# Patient Record
Sex: MALE | Race: WHITE | NOT HISPANIC OR LATINO | URBAN - METROPOLITAN AREA
[De-identification: names, ages, dates, MRNs, and addresses within clinical notes are randomized per-mention and may not be internally consistent; named-entity substitution may affect disease eponyms.]

---

## 2018-09-18 ENCOUNTER — OFFICE VISIT - HEALTHEAST (OUTPATIENT)
Dept: OTOLARYNGOLOGY | Facility: CLINIC | Age: 23
End: 2018-09-18

## 2018-09-18 ENCOUNTER — OFFICE VISIT - HEALTHEAST (OUTPATIENT)
Dept: AUDIOLOGY | Facility: CLINIC | Age: 23
End: 2018-09-18

## 2018-09-18 DIAGNOSIS — H72.91 PERFORATION OF RIGHT TYMPANIC MEMBRANE: ICD-10-CM

## 2018-09-18 DIAGNOSIS — H93.13 TINNITUS OF BOTH EARS: ICD-10-CM

## 2018-09-18 DIAGNOSIS — H72.91 PERFORATION OF TYMPANIC MEMBRANE, RIGHT: ICD-10-CM

## 2021-06-20 NOTE — PROGRESS NOTES
Hearing evaluation in conjunction with ENT exam (Dr. Mcfadden)    History:  Patient was referred from ED visit 8-19-18, with reported trauma in right ear due to likely cotton swab use and a possible right tympanic membrane perforation. Patient reported crackling in his ears, assumed it was cerumen, and tried to remove it. He still reported crackling, bilaterally, today, with bilateral non-pulsatile tinnitus, worse in the right ear than in the left. He endorsed aural fullness, wetness in his right ear, and difficulty hearing. 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 He indicated he does not wear hearing protection devices while at work.    Results:  Otoscopy was unremarkable in either ear. Hearing sensitivity was assessed with good reliability using insert phones.      Normal/borderline normal hearing sensitivity for 250-8000 Hz, bilaterally.    Speech reception thresholds showed agreement with pure tone findings in each ear. Word recognition ability was excellent, bilaterally, with presentation levels at typical conversational volume in both ears.    Tympanometry for the left ear yielded a hypermobile tracing, positive middle ear pressure, and normal canal volume. Tympanometry for the right ear yielded a flat tracing with a large canal volume, consistent with a tympanic membrane perforation in the right ear.    Recommendations:  Follow-up with ENT; retest hearing per medical management or patient concern.  Wear hearing protection consistently in noise to preserve residual hearing sensitivity.     Nash Edmonds, [facility]  [place] Licensed Audiologist [id number]

## 2021-06-20 NOTE — PROGRESS NOTES
Jarad Delgado is a 23 y.o. male seen in consultation for tinnitus.  Patient has noticed bilateral tinnitus (right worse than left), nonpulsatile.  Denies otologic history of infections or surgeries. Denies vertigo.  Was recently evaluated in the ED for foreign body in the ear and was noted to have a right perforation in the ear.  Had some infections as a child but does not recall tubes.    ALLERGY:  No Known Allergies    MEDICATIONS:     No current outpatient prescriptions on file prior to visit.     No current facility-administered medications on file prior to visit.        Past Medical/Surgical History, Family History and Social History reviewed in detail and documented separately in the medical record.    Complete Review of Systems:  A 10-point review was performed.  Pertinent positives are noted in the HPI and on a separate scanned document in the chart.    EXAM:  There were no vitals filed for this visit.    Nurse documentation reviewed  and documented separately.    General Appearance: Pleasant, alert, appropriate appearance for age. No acute distress    Head Exam: Normal. Normocephalic, atraumatic.    Eye Exam: Normal external eye, conjunctiva, lids, cornea. Extra-ocular movements are intact.    Left external ear: normal  Left otoscopic exam: Normal EAC. Normal TM     Right external ear: normal  Right otoscopic exam: Normal EAC. TM roughly normal but with radial ?Perforation? At the 6 o'clock position.  This is no more than a millimeter wide and a few millimeters long.    Nose Exam: Normal external nose. Septum midline. Nasal mucosa normal.  Inferior turbinates normal.    OroPharynx Exam: Dental hygiene adequate. Normal tongue. Normal buccal mucosa. Normal palate.  Normal pharynx. Normal tonsils.    Neck Exam: Supple, no masses or nodes. Trachea and larynx midline.    Thyroid Exam: No tenderness, nodules or enlargement.    Salivary Glands: nontender without masses    Neuro: Alert and oriented times 3, CN 2-12  grossly intact, no nystagmus, PERRL, EOMI, normal speech and gait    Chest/Respiratory Exam: Normal chest wall motion and respiratory effort. No audible stridor or wheezing.    Cardiovascular Exam: Regular rate and rhythm.  No cyanosis, clubbing or edema.    Pulses: carotid pulses normal    ASSESSMENT:  1. Perforation of tympanic membrane, right    2. Tinnitus of both ears        PLAN: Findings, assessment, and management options were discussed.  Discussed pathophysiology, masking techniques and triggers for tinnitus.  We discussed current guidelines in regards to approach to tinnitus.  Unfortunately there are not many satisfying answers but working on triggers may be helpful.  There is nothing compelling to repair the drum at this point and would say we could follow up PRN infections or decrease in hearing.

## 2021-06-26 ENCOUNTER — HEALTH MAINTENANCE LETTER (OUTPATIENT)
Age: 26
End: 2021-06-26

## 2021-10-16 ENCOUNTER — HEALTH MAINTENANCE LETTER (OUTPATIENT)
Age: 26
End: 2021-10-16

## 2022-07-23 ENCOUNTER — HEALTH MAINTENANCE LETTER (OUTPATIENT)
Age: 27
End: 2022-07-23

## 2022-10-01 ENCOUNTER — HEALTH MAINTENANCE LETTER (OUTPATIENT)
Age: 27
End: 2022-10-01

## 2023-08-06 ENCOUNTER — HEALTH MAINTENANCE LETTER (OUTPATIENT)
Age: 28
End: 2023-08-06